# Patient Record
Sex: MALE | Race: BLACK OR AFRICAN AMERICAN | NOT HISPANIC OR LATINO | Employment: OTHER | URBAN - METROPOLITAN AREA
[De-identification: names, ages, dates, MRNs, and addresses within clinical notes are randomized per-mention and may not be internally consistent; named-entity substitution may affect disease eponyms.]

---

## 2018-02-12 ENCOUNTER — HISTORICAL (OUTPATIENT)
Dept: ADMINISTRATIVE | Facility: HOSPITAL | Age: 63
End: 2018-02-12

## 2018-02-12 LAB
ABS NEUT (OLG): 1.89 X10(3)/MCL (ref 2.1–9.2)
ALBUMIN SERPL-MCNC: 3.3 GM/DL (ref 3.4–5)
ALBUMIN/GLOB SERPL: 1 RATIO (ref 1–2)
ALP SERPL-CCNC: 98 UNIT/L (ref 45–117)
ALT SERPL-CCNC: 30 UNIT/L (ref 12–78)
AST SERPL-CCNC: 31 UNIT/L (ref 15–37)
BASOPHILS # BLD AUTO: 0.03 X10(3)/MCL
BASOPHILS NFR BLD AUTO: 1 % (ref 0–1)
BILIRUB SERPL-MCNC: 0.3 MG/DL (ref 0.2–1)
BILIRUBIN DIRECT+TOT PNL SERPL-MCNC: <0.1 MG/DL
BILIRUBIN DIRECT+TOT PNL SERPL-MCNC: ABNORMAL MG/DL
BUN SERPL-MCNC: 9 MG/DL (ref 7–18)
CALCIUM SERPL-MCNC: 8.9 MG/DL (ref 8.5–10.1)
CHLORIDE SERPL-SCNC: 106 MMOL/L (ref 98–107)
CO2 SERPL-SCNC: 30 MMOL/L (ref 21–32)
CREAT SERPL-MCNC: 0.9 MG/DL (ref 0.6–1.3)
CREAT UR-MCNC: 96 MG/DL
EOSINOPHIL # BLD AUTO: 0.11 X10(3)/MCL
EOSINOPHIL NFR BLD AUTO: 2 % (ref 0–5)
ERYTHROCYTE [DISTWIDTH] IN BLOOD BY AUTOMATED COUNT: 14.6 % (ref 11.5–14.5)
EST. AVERAGE GLUCOSE BLD GHB EST-MCNC: 177 MG/DL
GLOBULIN SER-MCNC: 5 GM/ML (ref 2.3–3.5)
GLUCOSE SERPL-MCNC: 139 MG/DL (ref 74–106)
HBA1C MFR BLD: 7.8 % (ref 4.2–6.3)
HCT VFR BLD AUTO: 36.8 % (ref 40–51)
HGB BLD-MCNC: 11.2 GM/DL (ref 13.5–17.5)
IMM GRANULOCYTES # BLD AUTO: 0.01 10*3/UL
IMM GRANULOCYTES NFR BLD AUTO: 0 %
LYMPHOCYTES # BLD AUTO: 1.9 X10(3)/MCL
LYMPHOCYTES NFR BLD AUTO: 43 % (ref 15–40)
MCH RBC QN AUTO: 27.9 PG (ref 26–34)
MCHC RBC AUTO-ENTMCNC: 30.4 GM/DL (ref 31–37)
MCV RBC AUTO: 91.8 FL (ref 80–100)
MONOCYTES # BLD AUTO: 0.44 X10(3)/MCL
MONOCYTES NFR BLD AUTO: 10 % (ref 4–12)
NEUTROPHILS # BLD AUTO: 1.89 X10(3)/MCL
NEUTROPHILS NFR BLD AUTO: 43 X10(3)/MCL
PLATELET # BLD AUTO: 211 X10(3)/MCL (ref 130–400)
PMV BLD AUTO: 9.6 FL (ref 7.4–10.4)
POTASSIUM SERPL-SCNC: 4.2 MMOL/L (ref 3.5–5.1)
PROT SERPL-MCNC: 8.3 GM/DL (ref 6.4–8.2)
PROT UR STRIP-MCNC: 8.3 MG/DL
PROT/CREAT UR-RTO: 86.5 MG/GM
RBC # BLD AUTO: 4.01 X10(6)/MCL (ref 4.5–5.9)
SODIUM SERPL-SCNC: 141 MMOL/L (ref 136–145)
WBC # SPEC AUTO: 4.4 X10(3)/MCL (ref 4.5–11)

## 2018-02-15 LAB
COLOR STL: NORMAL
CONSISTENCY STL: NORMAL
HEMOCCULT SP1 STL QL: NEGATIVE

## 2018-02-19 LAB
COLOR STL: NORMAL
CONSISTENCY STL: NORMAL
HEMOCCULT SP2 STL QL: NEGATIVE

## 2018-02-20 LAB
COLOR STL: NORMAL
CONSISTENCY STL: NORMAL

## 2018-02-21 ENCOUNTER — HISTORICAL (OUTPATIENT)
Dept: INTERNAL MEDICINE | Facility: CLINIC | Age: 63
End: 2018-02-21

## 2020-07-01 ENCOUNTER — HISTORICAL (OUTPATIENT)
Dept: INTERNAL MEDICINE | Facility: CLINIC | Age: 65
End: 2020-07-01

## 2020-07-01 LAB
ABS NEUT (OLG): 2.09 X10(3)/MCL (ref 2.1–9.2)
ALBUMIN SERPL-MCNC: 3.8 GM/DL (ref 3.4–5)
ALBUMIN/GLOB SERPL: 0.8 RATIO (ref 1.1–2)
ALP SERPL-CCNC: 102 UNIT/L (ref 45–117)
ALT SERPL-CCNC: 32 UNIT/L (ref 12–78)
AST SERPL-CCNC: 48 UNIT/L (ref 15–37)
BASOPHILS # BLD AUTO: 0 X10(3)/MCL (ref 0–0.2)
BASOPHILS NFR BLD AUTO: 1 %
BILIRUB SERPL-MCNC: 0.5 MG/DL (ref 0.2–1)
BILIRUBIN DIRECT+TOT PNL SERPL-MCNC: 0.2 MG/DL (ref 0–0.2)
BILIRUBIN DIRECT+TOT PNL SERPL-MCNC: 0.3 MG/DL
BUN SERPL-MCNC: 13 MG/DL (ref 7–18)
CALCIUM SERPL-MCNC: 8.8 MG/DL (ref 8.5–10.1)
CHLORIDE SERPL-SCNC: 105 MMOL/L (ref 98–107)
CHOLEST SERPL-MCNC: 134 MG/DL
CHOLEST/HDLC SERPL: 2.1 {RATIO} (ref 0–5)
CO2 SERPL-SCNC: 28 MMOL/L (ref 21–32)
CREAT SERPL-MCNC: 1 MG/DL (ref 0.6–1.3)
CREAT UR-MCNC: 84 MG/DL
EOSINOPHIL # BLD AUTO: 0.1 X10(3)/MCL (ref 0–0.9)
EOSINOPHIL NFR BLD AUTO: 2 %
ERYTHROCYTE [DISTWIDTH] IN BLOOD BY AUTOMATED COUNT: 13 % (ref 11.5–14.5)
GLOBULIN SER-MCNC: 4.5 GM/ML (ref 2.3–3.5)
GLUCOSE SERPL-MCNC: 147 MG/DL (ref 74–106)
HCT VFR BLD AUTO: 39.2 % (ref 40–51)
HDLC SERPL-MCNC: 65 MG/DL (ref 40–59)
HGB BLD-MCNC: 12.2 GM/DL (ref 13.5–17.5)
LDLC SERPL CALC-MCNC: 55 MG/DL
LYMPHOCYTES # BLD AUTO: 2 X10(3)/MCL (ref 0.6–4.6)
LYMPHOCYTES NFR BLD AUTO: 44 %
MCH RBC QN AUTO: 28.5 PG (ref 26–34)
MCHC RBC AUTO-ENTMCNC: 31.1 GM/DL (ref 31–37)
MCV RBC AUTO: 91.6 FL (ref 80–100)
MICROALBUMIN UR-MCNC: 16.9 MG/L (ref 0–19)
MICROALBUMIN/CREAT RATIO PNL UR: 20.1 MCG/MG CR (ref 0–29)
MONOCYTES # BLD AUTO: 0.4 X10(3)/MCL (ref 0.1–1.3)
MONOCYTES NFR BLD AUTO: 8 %
NEUTROPHILS # BLD AUTO: 2.09 X10(3)/MCL (ref 2.1–9.2)
NEUTROPHILS NFR BLD AUTO: 45 %
PLATELET # BLD AUTO: 191 X10(3)/MCL (ref 130–400)
PMV BLD AUTO: 10.2 FL (ref 7.4–10.4)
POTASSIUM SERPL-SCNC: 3.7 MMOL/L (ref 3.5–5.1)
PROT SERPL-MCNC: 8.3 GM/DL (ref 6.4–8.2)
RBC # BLD AUTO: 4.28 X10(6)/MCL (ref 4.5–5.9)
SODIUM SERPL-SCNC: 139 MMOL/L (ref 136–145)
TRIGL SERPL-MCNC: 71 MG/DL
VLDLC SERPL CALC-MCNC: 14 MG/DL
WBC # SPEC AUTO: 4.6 X10(3)/MCL (ref 4.5–11)

## 2020-12-17 ENCOUNTER — HISTORICAL (OUTPATIENT)
Dept: INTERNAL MEDICINE | Facility: CLINIC | Age: 65
End: 2020-12-17

## 2021-06-28 ENCOUNTER — HISTORICAL (OUTPATIENT)
Dept: ADMINISTRATIVE | Facility: HOSPITAL | Age: 66
End: 2021-06-28

## 2021-06-28 LAB
ABS NEUT (OLG): 2.24 X10(3)/MCL (ref 2.1–9.2)
ALBUMIN SERPL-MCNC: 3.9 GM/DL (ref 3.4–4.8)
ALBUMIN/GLOB SERPL: 1.1 RATIO (ref 1.1–2)
ALP SERPL-CCNC: 68 UNIT/L (ref 40–150)
ALT SERPL-CCNC: 19 UNIT/L (ref 0–55)
AST SERPL-CCNC: 34 UNIT/L (ref 5–34)
BASOPHILS # BLD AUTO: 0 X10(3)/MCL (ref 0–0.2)
BASOPHILS NFR BLD AUTO: 1 %
BILIRUB SERPL-MCNC: 0.4 MG/DL
BILIRUBIN DIRECT+TOT PNL SERPL-MCNC: 0.2 MG/DL (ref 0–0.5)
BILIRUBIN DIRECT+TOT PNL SERPL-MCNC: 0.2 MG/DL (ref 0–0.8)
BUN SERPL-MCNC: 11.7 MG/DL (ref 8.4–25.7)
CALCIUM SERPL-MCNC: 9.3 MG/DL (ref 8.8–10)
CHLORIDE SERPL-SCNC: 106 MMOL/L (ref 98–107)
CO2 SERPL-SCNC: 27 MMOL/L (ref 23–31)
CREAT SERPL-MCNC: 0.9 MG/DL (ref 0.73–1.18)
EOSINOPHIL # BLD AUTO: 0.1 X10(3)/MCL (ref 0–0.9)
EOSINOPHIL NFR BLD AUTO: 3 %
ERYTHROCYTE [DISTWIDTH] IN BLOOD BY AUTOMATED COUNT: 13.2 % (ref 11.5–14.5)
EST. AVERAGE GLUCOSE BLD GHB EST-MCNC: 128.4 MG/DL
GLOBULIN SER-MCNC: 3.6 GM/DL (ref 2.4–3.5)
GLUCOSE SERPL-MCNC: 109 MG/DL (ref 82–115)
HBA1C MFR BLD: 6.1 %
HCT VFR BLD AUTO: 40.1 % (ref 40–51)
HGB BLD-MCNC: 12.8 GM/DL (ref 13.5–17.5)
IMM GRANULOCYTES # BLD AUTO: 0.01 10*3/UL
IMM GRANULOCYTES NFR BLD AUTO: 0 %
LYMPHOCYTES # BLD AUTO: 1.8 X10(3)/MCL (ref 0.6–4.6)
LYMPHOCYTES NFR BLD AUTO: 39 %
MCH RBC QN AUTO: 29.5 PG (ref 26–34)
MCHC RBC AUTO-ENTMCNC: 31.9 GM/DL (ref 31–37)
MCV RBC AUTO: 92.4 FL (ref 80–100)
MONOCYTES # BLD AUTO: 0.4 X10(3)/MCL (ref 0.1–1.3)
MONOCYTES NFR BLD AUTO: 8 %
NEUTROPHILS # BLD AUTO: 2.24 X10(3)/MCL (ref 2.1–9.2)
NEUTROPHILS NFR BLD AUTO: 49 %
NRBC BLD AUTO-RTO: 0 % (ref 0–0.2)
PLATELET # BLD AUTO: 173 X10(3)/MCL (ref 130–400)
PMV BLD AUTO: 10.2 FL (ref 7.4–10.4)
POTASSIUM SERPL-SCNC: 3.7 MMOL/L (ref 3.5–5.1)
PROT SERPL-MCNC: 7.5 GM/DL (ref 5.8–7.6)
RBC # BLD AUTO: 4.34 X10(6)/MCL (ref 4.5–5.9)
SODIUM SERPL-SCNC: 141 MMOL/L (ref 136–145)
WBC # SPEC AUTO: 4.6 X10(3)/MCL (ref 4.5–11)

## 2022-04-06 LAB
LEFT EYE DM RETINOPATHY: NEGATIVE
LEFT EYE DM RETINOPATHY: NEGATIVE
RIGHT EYE DM RETINOPATHY: NEGATIVE
RIGHT EYE DM RETINOPATHY: NEGATIVE

## 2022-04-12 ENCOUNTER — HISTORICAL (OUTPATIENT)
Dept: ADMINISTRATIVE | Facility: HOSPITAL | Age: 67
End: 2022-04-12

## 2022-04-28 DIAGNOSIS — E78.5 HYPERLIPIDEMIA, UNSPECIFIED HYPERLIPIDEMIA TYPE: ICD-10-CM

## 2022-04-28 DIAGNOSIS — I10 HYPERTENSION, UNSPECIFIED TYPE: Primary | ICD-10-CM

## 2022-04-30 VITALS
DIASTOLIC BLOOD PRESSURE: 70 MMHG | SYSTOLIC BLOOD PRESSURE: 135 MMHG | BODY MASS INDEX: 22.04 KG/M2 | HEIGHT: 67 IN | WEIGHT: 140.44 LBS

## 2022-06-24 RX ORDER — METFORMIN HYDROCHLORIDE 1000 MG/1
1000 TABLET ORAL 2 TIMES DAILY
COMMUNITY
Start: 2022-03-17 | End: 2022-06-24 | Stop reason: SDUPTHER

## 2022-06-26 RX ORDER — METFORMIN HYDROCHLORIDE 1000 MG/1
1000 TABLET ORAL 2 TIMES DAILY
Qty: 60 TABLET | Refills: 3 | OUTPATIENT
Start: 2022-06-26 | End: 2022-06-27 | Stop reason: SDUPTHER

## 2022-06-27 RX ORDER — METFORMIN HYDROCHLORIDE 1000 MG/1
1000 TABLET ORAL 2 TIMES DAILY
Qty: 184 TABLET | Refills: 0 | Status: SHIPPED | OUTPATIENT
Start: 2022-06-27 | End: 2022-09-08 | Stop reason: SDUPTHER

## 2022-06-27 NOTE — TELEPHONE ENCOUNTER
Can you please re-sign this med? It was printed out instead of being sent to the pharmacy. I pended it to you correctly. Thanks!

## 2022-09-07 NOTE — PROGRESS NOTES
"[facility] INTERNAL MEDICINE  OUTPATIENT OFFICE VISIT NOTE    SUBJECTIVE:      HPI: Elsa De Guzman is a 66 y.o. yo male w/ PMH of DM, HTN, HLD, HX of CVA, HX of tobacco use, who presents today for F/U visit. He has no complaints at this time. He states able to engage in daily activities independently and w/o any difficulties.     ROS:  (-) Chest pain, palpitations, SOB, fever, night sweats, chills, diarrhea, constipation.       OBJECTIVE:     Vital signs:   BP (!) 148/75 (BP Location: Left arm, Patient Position: Sitting, BP Method: Large (Automatic))   Pulse 78   Temp 98.1 °F (36.7 °C) (Oral)   Resp 18   Ht 5' 6.93" (1.7 m)   Wt 64 kg (141 lb 3.2 oz)   BMI 22.16 kg/m²      Physical Examination:  General: Thin w/o distress  HEENT: NC/AT; PERRL; hard of hearing bilaterally; nasal and oral mucosa moist and clear  Neck: Full ROM; no lymphadenopathy  Pulm: CTA bilaterally, normal work of breathing  CV: S1, S2 w/o murmurs or gallops; no edema noted  GI: Soft with normal bowel sounds in all quadrants, no masses on palpation  MSK: Full ROM of all extremities and spine w/o limitation or discomfort  Derm: No rashes, abnormal bruising, or skin lesions  Neuro: AAOx4; motor/sensory function intact  Psych: Cooperative; appropriate mood and affect    Diabetic Foot Exam:   - Current Complaints: None   - Nails: Thickened   - Foot Deformities: Bilateral bunions     - Amputations: None    - Pedal Pulses: +2 bilaterally    - Dorsalis pedis: +2 bilaterally     - Posterior tibial: +2 bilaterally  - Skin Condition: Warm and dry bilaterally  - Sensory Exam: Intact bilaterally  - Risk: None  - Footwear: Sports shoes  - Education: DM foot care education provided and instructed patient to avoid going outside barefooted    Significant findings:  None     ASSESSMENT & PLAN:     DM  -At home fasting blood glucose level range   -Continue Metformin 1g BID    HTN  -Slightly hypertensive at this time; patient states his SBP at home range is " 120-130  -Continue Lisinopril 10mg daily    HLD  -Lipid panel WNL on 7/1/2020; will repeat lipid panel   -Continue atorvastatin 40mg daily    HX of CVA (2009)  -Patient is independent with ADLs/IADLs  -Continue ASA 81mg daily  -Continue atorvastatin 40mg daily    HX of tobacco use  -Smoked 1 PPD x 35 years  -Quit 15 years ago  -Patient refused lung cancer screening scan at this time    Health Maintenance:  -PPV23 (2/12/2018)  -COVID vaccines not received   -Shingrix #1 (11/17/2020) #2 (4/6/2022)  -Tdap (7/1/2020)  -Cologuard ordered today  -Hepatitis C screening ordered today  -Refused flu shot  -Will need eye exam next F/U visit    Return to clinic in 5 months. F/U on CMP, CBC, lipid panel, A1c, hep C.     Adolfo Vences,    [facility] Internal Medicine, PGY-1

## 2022-09-08 ENCOUNTER — OFFICE VISIT (OUTPATIENT)
Dept: INTERNAL MEDICINE | Facility: CLINIC | Age: 67
End: 2022-09-08
Payer: MEDICARE

## 2022-09-08 ENCOUNTER — LAB VISIT (OUTPATIENT)
Dept: LAB | Facility: HOSPITAL | Age: 67
End: 2022-09-08
Attending: STUDENT IN AN ORGANIZED HEALTH CARE EDUCATION/TRAINING PROGRAM
Payer: MEDICARE

## 2022-09-08 VITALS
TEMPERATURE: 98 F | HEIGHT: 67 IN | WEIGHT: 141.19 LBS | RESPIRATION RATE: 18 BRPM | BODY MASS INDEX: 22.16 KG/M2 | HEART RATE: 78 BPM | SYSTOLIC BLOOD PRESSURE: 148 MMHG | DIASTOLIC BLOOD PRESSURE: 75 MMHG

## 2022-09-08 DIAGNOSIS — E78.5 HYPERLIPIDEMIA, UNSPECIFIED HYPERLIPIDEMIA TYPE: ICD-10-CM

## 2022-09-08 DIAGNOSIS — Z87.891 HISTORY OF TOBACCO ABUSE: ICD-10-CM

## 2022-09-08 DIAGNOSIS — Z00.00 HEALTHCARE MAINTENANCE: ICD-10-CM

## 2022-09-08 DIAGNOSIS — I10 HYPERTENSION, UNSPECIFIED TYPE: ICD-10-CM

## 2022-09-08 DIAGNOSIS — E11.9 TYPE 2 DIABETES MELLITUS WITHOUT COMPLICATION, WITHOUT LONG-TERM CURRENT USE OF INSULIN: Primary | ICD-10-CM

## 2022-09-08 DIAGNOSIS — Z86.73 HISTORY OF CVA (CEREBROVASCULAR ACCIDENT): ICD-10-CM

## 2022-09-08 DIAGNOSIS — E11.9 TYPE 2 DIABETES MELLITUS WITHOUT COMPLICATION, WITHOUT LONG-TERM CURRENT USE OF INSULIN: ICD-10-CM

## 2022-09-08 LAB
ALBUMIN SERPL-MCNC: 4 GM/DL (ref 3.4–4.8)
ALBUMIN/GLOB SERPL: 1.1 RATIO (ref 1.1–2)
ALP SERPL-CCNC: 87 UNIT/L (ref 40–150)
ALT SERPL-CCNC: 24 UNIT/L (ref 0–55)
AST SERPL-CCNC: 31 UNIT/L (ref 5–34)
BASOPHILS # BLD AUTO: 0.04 X10(3)/MCL (ref 0–0.2)
BASOPHILS NFR BLD AUTO: 0.9 %
BILIRUBIN DIRECT+TOT PNL SERPL-MCNC: 0.3 MG/DL
BUN SERPL-MCNC: 12.7 MG/DL (ref 8.4–25.7)
CALCIUM SERPL-MCNC: 9.6 MG/DL (ref 8.8–10)
CHLORIDE SERPL-SCNC: 105 MMOL/L (ref 98–107)
CHOLEST SERPL-MCNC: 150 MG/DL
CHOLEST/HDLC SERPL: 3 {RATIO} (ref 0–5)
CO2 SERPL-SCNC: 27 MMOL/L (ref 23–31)
CREAT SERPL-MCNC: 1.04 MG/DL (ref 0.73–1.18)
EOSINOPHIL # BLD AUTO: 0.12 X10(3)/MCL (ref 0–0.9)
EOSINOPHIL NFR BLD AUTO: 2.8 %
ERYTHROCYTE [DISTWIDTH] IN BLOOD BY AUTOMATED COUNT: 12.9 % (ref 11.5–17)
EST. AVERAGE GLUCOSE BLD GHB EST-MCNC: 139.9 MG/DL
GFR SERPLBLD CREATININE-BSD FMLA CKD-EPI: >60 MLS/MIN/1.73/M2
GLOBULIN SER-MCNC: 3.7 GM/DL (ref 2.4–3.5)
GLUCOSE SERPL-MCNC: 117 MG/DL (ref 82–115)
HBA1C MFR BLD: 6.5 %
HCT VFR BLD AUTO: 42.3 % (ref 42–52)
HCV AB SERPL QL IA: NONREACTIVE
HDLC SERPL-MCNC: 57 MG/DL (ref 35–60)
HGB BLD-MCNC: 13.2 GM/DL (ref 14–18)
IMM GRANULOCYTES # BLD AUTO: 0.01 X10(3)/MCL (ref 0–0.04)
IMM GRANULOCYTES NFR BLD AUTO: 0.2 %
LDLC SERPL CALC-MCNC: 66 MG/DL (ref 50–140)
LYMPHOCYTES # BLD AUTO: 1.79 X10(3)/MCL (ref 0.6–4.6)
LYMPHOCYTES NFR BLD AUTO: 41.1 %
MCH RBC QN AUTO: 29.6 PG (ref 27–31)
MCHC RBC AUTO-ENTMCNC: 31.2 MG/DL (ref 33–36)
MCV RBC AUTO: 94.8 FL (ref 80–94)
MONOCYTES # BLD AUTO: 0.37 X10(3)/MCL (ref 0.1–1.3)
MONOCYTES NFR BLD AUTO: 8.5 %
NEUTROPHILS # BLD AUTO: 2 X10(3)/MCL (ref 2.1–9.2)
NEUTROPHILS NFR BLD AUTO: 46.5 %
NRBC BLD AUTO-RTO: 0 %
PLATELET # BLD AUTO: 207 X10(3)/MCL (ref 130–400)
PMV BLD AUTO: 11 FL (ref 7.4–10.4)
POTASSIUM SERPL-SCNC: 5 MMOL/L (ref 3.5–5.1)
PROT SERPL-MCNC: 7.7 GM/DL (ref 5.8–7.6)
RBC # BLD AUTO: 4.46 X10(6)/MCL (ref 4.7–6.1)
SODIUM SERPL-SCNC: 142 MMOL/L (ref 136–145)
TRIGL SERPL-MCNC: 136 MG/DL (ref 34–140)
VLDLC SERPL CALC-MCNC: 27 MG/DL
WBC # SPEC AUTO: 4.4 X10(3)/MCL (ref 4.5–11.5)

## 2022-09-08 PROCEDURE — 80061 LIPID PANEL: CPT

## 2022-09-08 PROCEDURE — 83036 HEMOGLOBIN GLYCOSYLATED A1C: CPT

## 2022-09-08 PROCEDURE — 85025 COMPLETE CBC W/AUTO DIFF WBC: CPT

## 2022-09-08 PROCEDURE — 99213 OFFICE O/P EST LOW 20 MIN: CPT | Mod: PBBFAC

## 2022-09-08 PROCEDURE — 80053 COMPREHEN METABOLIC PANEL: CPT

## 2022-09-08 PROCEDURE — 84156 ASSAY OF PROTEIN URINE: CPT

## 2022-09-08 PROCEDURE — 36415 COLL VENOUS BLD VENIPUNCTURE: CPT

## 2022-09-08 PROCEDURE — 86803 HEPATITIS C AB TEST: CPT

## 2022-09-08 RX ORDER — ATORVASTATIN CALCIUM 40 MG/1
40 TABLET, FILM COATED ORAL DAILY
Qty: 90 TABLET | Refills: 1 | Status: SHIPPED | OUTPATIENT
Start: 2022-09-08 | End: 2023-02-24 | Stop reason: SDUPTHER

## 2022-09-08 RX ORDER — LISINOPRIL 10 MG/1
10 TABLET ORAL DAILY
Qty: 90 TABLET | Refills: 1 | Status: SHIPPED | OUTPATIENT
Start: 2022-09-08 | End: 2023-02-24 | Stop reason: SDUPTHER

## 2022-09-08 RX ORDER — METFORMIN HYDROCHLORIDE 1000 MG/1
1000 TABLET ORAL 2 TIMES DAILY
Qty: 180 TABLET | Refills: 1 | Status: SHIPPED | OUTPATIENT
Start: 2022-09-08 | End: 2023-02-24 | Stop reason: SDUPTHER

## 2022-09-08 RX ORDER — NAPROXEN SODIUM 220 MG/1
81 TABLET, FILM COATED ORAL DAILY
COMMUNITY
End: 2022-09-08 | Stop reason: SDUPTHER

## 2022-09-08 RX ORDER — NAPROXEN SODIUM 220 MG/1
81 TABLET, FILM COATED ORAL DAILY
Qty: 90 TABLET | Refills: 1 | Status: SHIPPED | OUTPATIENT
Start: 2022-09-08 | End: 2023-02-24 | Stop reason: SDUPTHER

## 2022-09-08 RX ORDER — ATORVASTATIN CALCIUM 40 MG/1
40 TABLET, FILM COATED ORAL DAILY
COMMUNITY
Start: 2022-05-09 | End: 2022-09-08 | Stop reason: SDUPTHER

## 2022-09-08 RX ORDER — LISINOPRIL 10 MG/1
10 TABLET ORAL DAILY
COMMUNITY
Start: 2021-12-14 | End: 2022-09-08 | Stop reason: SDUPTHER

## 2022-12-01 ENCOUNTER — DOCUMENTATION ONLY (OUTPATIENT)
Dept: INTERNAL MEDICINE | Facility: CLINIC | Age: 67
End: 2022-12-01
Payer: MEDICARE

## 2022-12-30 ENCOUNTER — DOCUMENTATION ONLY (OUTPATIENT)
Dept: ADMINISTRATIVE | Facility: HOSPITAL | Age: 67
End: 2022-12-30
Payer: MEDICARE

## 2023-02-20 ENCOUNTER — TELEPHONE (OUTPATIENT)
Dept: INTERNAL MEDICINE | Facility: CLINIC | Age: 68
End: 2023-02-20
Payer: MEDICARE

## 2023-02-20 NOTE — TELEPHONE ENCOUNTER
Unable to speak to pt over the phone. Left voice message that we need to cancel appointment for today and provider is out of the office. We will be calling him back to reschedule appointment.

## 2023-02-22 NOTE — PROGRESS NOTES
"[facility] INTERNAL MEDICINE  OUTPATIENT OFFICE VISIT NOTE    SUBJECTIVE:      HPI: Elsa De Guzman is a 67 y.o. yo male w/ PMH of DM, HTN, HLD, HX of CVA, HX of tobacco use, who presents today for F/U visit. He has acute no complaints at this time. He continues to tolerated daily activities w/o difficulties. He states having increased intake of soft drinks (1-2 cans per day) since holiday season in 2022.     ROS:  (-) Chest pain, palpitations, SOB, fever, night sweats, chills, diarrhea, constipation.       OBJECTIVE:     Vital signs:   BP (!) 165/81 (BP Location: Left arm, Patient Position: Sitting, BP Method: Large (Automatic))   Pulse 80   Temp 97.9 °F (36.6 °C) (Oral)   Resp 18   Ht 5' 6.93" (1.7 m)   Wt 70 kg (154 lb 6.4 oz)   BMI 24.23 kg/m²      Physical Examination:  General: Thin w/o distress  HEENT: NC/AT; PERRL; hard of hearing bilaterally; nasal and oral mucosa moist and clear  Neck: Full ROM; no lymphadenopathy  Pulm: CTA bilaterally, normal work of breathing  CV: S1, S2 w/o murmurs or gallops; no edema noted  GI: Soft with normal bowel sounds in all quadrants, no masses on palpation  MSK: Full ROM of all extremities and spine w/o limitation or discomfort  Derm: No rashes, abnormal bruising, or skin lesions  Neuro: AAOx4; motor/sensory function intact  Psych: Cooperative; appropriate mood and affect    Significant findings:  None     ASSESSMENT & PLAN:     DM  -At home fasting blood glucose level range 100-110  -Patient states having increased intake of diet coke (1-2 cans daily for since holiday season in 2022); instructed patient to cut down on soft drinks  -A1c 6.5 on 9/8/2022; POC A1c 7.9 on 2/24/2023  -Continue Metformin 1g BID    HTN  -Hypertensive at this time d/t running out of home meds for a week; patient states his SBP at home range is 120-130  -BP goal should be <130/80  -Continue Lisinopril 10mg daily; may need to up titrate Lisinopril if BP continues to be elevated    HLD  -Lipid panel " 9/8/2022: cholesterol 150, LDL 66, trig 136  -Continue atorvastatin 40mg daily    HX of CVA (2009)  -Patient is independent with ADLs/IADLs  -Continue ASA 81mg daily  -Continue atorvastatin 40mg daily    HX of tobacco use  -Smoked 1 PPD x 35 years  -Quit 15 years ago  -Patient refused lung cancer screening scan at this time    Health Maintenance:  -PPV23 (2/12/2018); PCV 20 (2/24/2023)  -COVID vaccines not received   -Shingrix #1 (11/17/2020) #2 (4/6/2022)  -Tdap (7/1/2020)  -Cologuard ordered 2/24/2023  -Hepatitis C non-reactive 9/8/2022  -Refused flu shot  -DM foot exam performed on 9/8/2022  -Fundus eye exam 2/24/2023  -AAA screening ordered 2/24/2023    Return to clinic in 4 months. Nurse visit for BP check in 1 week.    Adolfo Vences DO   [facility] Internal Medicine, PGY-II

## 2023-02-24 ENCOUNTER — OFFICE VISIT (OUTPATIENT)
Dept: INTERNAL MEDICINE | Facility: CLINIC | Age: 68
End: 2023-02-24
Payer: MEDICARE

## 2023-02-24 VITALS
RESPIRATION RATE: 18 BRPM | DIASTOLIC BLOOD PRESSURE: 81 MMHG | BODY MASS INDEX: 24.23 KG/M2 | SYSTOLIC BLOOD PRESSURE: 165 MMHG | HEIGHT: 67 IN | WEIGHT: 154.38 LBS | TEMPERATURE: 98 F | HEART RATE: 80 BPM

## 2023-02-24 DIAGNOSIS — Z86.73 HISTORY OF CVA (CEREBROVASCULAR ACCIDENT): ICD-10-CM

## 2023-02-24 DIAGNOSIS — E11.69 TYPE 2 DIABETES MELLITUS WITH OTHER SPECIFIED COMPLICATION, WITHOUT LONG-TERM CURRENT USE OF INSULIN: Primary | ICD-10-CM

## 2023-02-24 DIAGNOSIS — Z23 NEED FOR PNEUMOCOCCAL VACCINATION: ICD-10-CM

## 2023-02-24 DIAGNOSIS — Z12.12 SCREENING FOR COLORECTAL CANCER: ICD-10-CM

## 2023-02-24 DIAGNOSIS — Z13.6 SCREENING FOR AAA (AORTIC ABDOMINAL ANEURYSM): ICD-10-CM

## 2023-02-24 DIAGNOSIS — Z87.891 HISTORY OF TOBACCO USE: ICD-10-CM

## 2023-02-24 DIAGNOSIS — I10 HYPERTENSION, UNSPECIFIED TYPE: ICD-10-CM

## 2023-02-24 DIAGNOSIS — Z12.11 SCREENING FOR COLORECTAL CANCER: ICD-10-CM

## 2023-02-24 DIAGNOSIS — E78.5 HYPERLIPIDEMIA, UNSPECIFIED HYPERLIPIDEMIA TYPE: ICD-10-CM

## 2023-02-24 LAB — HBA1C MFR BLD: 7.9 %

## 2023-02-24 PROCEDURE — 83036 HEMOGLOBIN GLYCOSYLATED A1C: CPT | Mod: PBBFAC

## 2023-02-24 PROCEDURE — 99213 OFFICE O/P EST LOW 20 MIN: CPT | Mod: PBBFAC

## 2023-02-24 PROCEDURE — 90677 PCV20 VACCINE IM: CPT | Mod: PBBFAC

## 2023-02-24 RX ORDER — LISINOPRIL 10 MG/1
10 TABLET ORAL DAILY
Qty: 90 TABLET | Refills: 1 | Status: SHIPPED | OUTPATIENT
Start: 2023-02-24 | End: 2023-06-14 | Stop reason: SDUPTHER

## 2023-02-24 RX ORDER — NAPROXEN SODIUM 220 MG/1
81 TABLET, FILM COATED ORAL DAILY
Qty: 90 TABLET | Refills: 1 | Status: SHIPPED | OUTPATIENT
Start: 2023-02-24 | End: 2023-11-28

## 2023-02-24 RX ORDER — ATORVASTATIN CALCIUM 40 MG/1
40 TABLET, FILM COATED ORAL DAILY
Qty: 90 TABLET | Refills: 1 | Status: SHIPPED | OUTPATIENT
Start: 2023-02-24 | End: 2023-06-14 | Stop reason: SDUPTHER

## 2023-02-24 RX ORDER — METFORMIN HYDROCHLORIDE 1000 MG/1
1000 TABLET ORAL 2 TIMES DAILY
Qty: 180 TABLET | Refills: 1 | Status: SHIPPED | OUTPATIENT
Start: 2023-02-24 | End: 2023-06-14 | Stop reason: SDUPTHER

## 2023-02-24 NOTE — PROGRESS NOTES
Faculty addendum:     I have reviewed the patients history, residents  findings on physical examination, diagnosis and treatment plan. Care provided was reasonable and necessary.     Bring back in a week for blood pressure check, resume home lisinopril at this time.    For patient's diabetes mellitus, resume metformin, recheck A1c prior to next clinic appointment 3 months.

## 2023-03-03 ENCOUNTER — CLINICAL SUPPORT (OUTPATIENT)
Dept: INTERNAL MEDICINE | Facility: CLINIC | Age: 68
End: 2023-03-03
Payer: MEDICARE

## 2023-03-03 VITALS
DIASTOLIC BLOOD PRESSURE: 70 MMHG | BODY MASS INDEX: 24.17 KG/M2 | TEMPERATURE: 98 F | WEIGHT: 154 LBS | SYSTOLIC BLOOD PRESSURE: 136 MMHG | HEIGHT: 67 IN | HEART RATE: 84 BPM | RESPIRATION RATE: 18 BRPM

## 2023-03-03 DIAGNOSIS — I10 HYPERTENSION, UNSPECIFIED TYPE: Primary | ICD-10-CM

## 2023-03-03 PROCEDURE — 99214 OFFICE O/P EST MOD 30 MIN: CPT | Mod: PBBFAC

## 2023-03-03 NOTE — PROGRESS NOTES
Here for BP Check per DR Adolfo Vences    BP Readin/70 manual reading    CT:84    Last dose of Medications: Lisinopril 10 mg taken 23 @     0800 am.    Complaints:none.   Provider sent  Blood Pressure reading. .        Patient iis not following low sodium diet plan. Patient instructed pn low sodium diet plan, foods to avoid and foods okay to eat. Patient voiced understanding of this information.                Discharged home with instructions and information to continue with all prescribed medications,follow up appointments, drink plenty of water daily, maintain low sodium intake and to walk/ exercise daily.Patient voiced understanding of discharge instructions.

## 2023-04-12 LAB
CREAT UR-MCNC: 64.8 MG/DL (ref 63–166)
PROT UR STRIP-MCNC: <6.8 MG/DL

## 2023-06-11 NOTE — PROGRESS NOTES
"[facility] INTERNAL MEDICINE  OUTPATIENT OFFICE VISIT NOTE    SUBJECTIVE:      HPI: Elsa De Guzman is a 67 y.o. yo male w/ PMH of DM, HTN, HLD, HX of CVA, HX of tobacco use, who presents today for F/U visit. He states feeling well and has no acute complaints since the last F/U visit.     ROS:  (-) Chest pain, palpitations, SOB, fever, night sweats, chills, diarrhea, constipation.       OBJECTIVE:     Vital signs:   /80 (BP Location: Left arm, Patient Position: Sitting, BP Method: Medium (Manual))   Pulse 80   Temp 98.2 °F (36.8 °C) (Oral)   Resp 18   Ht 5' 6" (1.676 m)   Wt 68 kg (150 lb)   SpO2 99%   BMI 24.21 kg/m²      Physical Examination:  General: Thin w/o distress  HEENT: NC/AT; PERRL; hard of hearing bilaterally; nasal and oral mucosa moist and clear  Pulm: CTA bilaterally, normal work of breathing  CV: S1, S2 w/o murmurs or gallops; no edema noted  GI: Bowel sound present in all quadrants  MSK: Full ROM of all extremities and spine w/o limitation or discomfort  Derm: No rashes, abnormal bruising, or skin lesions  Neuro: AAOx4; motor/sensory function intact  Psych: Cooperative; appropriate mood and affect    Diabetic Foot Exam:   - Medical History: DM II  - Current Complaints: None  - Nails: Long  - Foot Deformities: None    - Amputations: None    - Pedal Pulses: +2 bilaterally    - Dorsalis pedis: +2 bilaterally     - Posterior tibial: +2 bilaterally  - Skin Condition: Warm and dry bilaterally  - Sensory Exam: Intact bilaterally  - Risk: None  - Footwear: Sports shoes  - Education: DM foot care education provided and instructed patient to avoid going outside barefooted    Significant findings:  None     ASSESSMENT & PLAN:     DM  -POC A1c 7.9 on (2/24/2023); POC A1c 7.1 (6/14/2023)  -Patient has completely eliminated soft drinks from his diet  -Continue Metformin 1g BID    HTN  -Normotensive at this time   -BP goal should be <130/80  -Continue Lisinopril 10mg daily; may need to up titrate Lisinopril if " BP continues to be elevated    HLD  -Lipid panel 9/8/2022: cholesterol 150, LDL 66, trig 136  -Continue atorvastatin 40mg daily    HX of CVA (2009)  -Patient is independent with ADLs/IADLs  -Continue ASA 81mg daily  -Continue atorvastatin 40mg daily    HX of tobacco use  -Smoked 1 PPD x 35 years  -Quit 15 years ago  -Patient refuses low dose lung CT scan    Health Maintenance:  -Cologuard ordered 2/24/2023, instructed patient to complete the test  -Hepatitis C non-reactive 9/8/2022  -DM foot exam performed 6/14/2023  -Fundus eye exam ordered 6/14/2023  -AAA screening (3/8/2023) negative  -Vaccines:    -PPV23 (2/12/2018); PCV 20 (2/24/2023)    -COVID vaccines not received     -Shingrix #1 (11/17/2020) #2 (4/6/2022)    -Tdap (7/1/2020)    -Refuses flu shot    Return to clinic in 5 months. F/U on cologuard, CMP, CBC, lipid panel. Referral to podiatry placed on 6/14/2023.    Adolfo Vences DO   [facility] Internal Medicine, PGY-II

## 2023-06-14 ENCOUNTER — CLINICAL SUPPORT (OUTPATIENT)
Dept: INTERNAL MEDICINE | Facility: CLINIC | Age: 68
End: 2023-06-14
Attending: STUDENT IN AN ORGANIZED HEALTH CARE EDUCATION/TRAINING PROGRAM
Payer: MEDICARE

## 2023-06-14 ENCOUNTER — OFFICE VISIT (OUTPATIENT)
Dept: INTERNAL MEDICINE | Facility: CLINIC | Age: 68
End: 2023-06-14
Payer: MEDICARE

## 2023-06-14 VITALS
DIASTOLIC BLOOD PRESSURE: 80 MMHG | BODY MASS INDEX: 24.11 KG/M2 | OXYGEN SATURATION: 99 % | TEMPERATURE: 98 F | HEIGHT: 66 IN | WEIGHT: 150 LBS | RESPIRATION RATE: 18 BRPM | HEART RATE: 80 BPM | SYSTOLIC BLOOD PRESSURE: 124 MMHG

## 2023-06-14 DIAGNOSIS — I10 HYPERTENSION, UNSPECIFIED TYPE: ICD-10-CM

## 2023-06-14 DIAGNOSIS — Z00.00 HEALTHCARE MAINTENANCE: ICD-10-CM

## 2023-06-14 DIAGNOSIS — E78.5 HYPERLIPIDEMIA, UNSPECIFIED HYPERLIPIDEMIA TYPE: ICD-10-CM

## 2023-06-14 DIAGNOSIS — Z87.891 HISTORY OF TOBACCO USE: ICD-10-CM

## 2023-06-14 DIAGNOSIS — Z86.73 HISTORY OF CVA (CEREBROVASCULAR ACCIDENT): ICD-10-CM

## 2023-06-14 DIAGNOSIS — E11.8 CONTROLLED TYPE 2 DIABETES MELLITUS WITH COMPLICATION, WITHOUT LONG-TERM CURRENT USE OF INSULIN: Primary | ICD-10-CM

## 2023-06-14 DIAGNOSIS — E11.8 CONTROLLED TYPE 2 DIABETES MELLITUS WITH COMPLICATION, WITHOUT LONG-TERM CURRENT USE OF INSULIN: ICD-10-CM

## 2023-06-14 LAB — HBA1C MFR BLD: 7.1 %

## 2023-06-14 PROCEDURE — 83036 HEMOGLOBIN GLYCOSYLATED A1C: CPT | Mod: PBBFAC

## 2023-06-14 PROCEDURE — 99214 OFFICE O/P EST MOD 30 MIN: CPT | Mod: PBBFAC

## 2023-06-14 RX ORDER — LISINOPRIL 10 MG/1
10 TABLET ORAL DAILY
Qty: 90 TABLET | Refills: 1 | Status: SHIPPED | OUTPATIENT
Start: 2023-06-14 | End: 2023-11-28 | Stop reason: SDUPTHER

## 2023-06-14 RX ORDER — METFORMIN HYDROCHLORIDE 1000 MG/1
1000 TABLET ORAL 2 TIMES DAILY
Qty: 180 TABLET | Refills: 1 | Status: SHIPPED | OUTPATIENT
Start: 2023-06-14 | End: 2023-11-28 | Stop reason: SDUPTHER

## 2023-06-14 RX ORDER — ATORVASTATIN CALCIUM 40 MG/1
40 TABLET, FILM COATED ORAL DAILY
Qty: 90 TABLET | Refills: 1 | Status: SHIPPED | OUTPATIENT
Start: 2023-06-14 | End: 2023-11-28 | Stop reason: SDUPTHER

## 2023-06-20 NOTE — PROGRESS NOTES
"Chief Complaint   Patient presents with     RECHECK       Initial /80 (BP Location: Right arm, Patient Position: Sitting, Cuff Size: Adult Regular)  Pulse 61  Ht 5' 11.06\" (1.805 m)  Wt 202 lb 6.4 oz (91.8 kg)  SpO2 98%  BMI 28.18 kg/m2 Estimated body mass index is 28.18 kg/(m^2) as calculated from the following:    Height as of this encounter: 5' 11.06\" (1.805 m).    Weight as of this encounter: 202 lb 6.4 oz (91.8 kg).  Medication Reconciliation: complete   Angie Calderon MA      " Elsa De Guzman is a 67 y.o. male here for a diabetic eye screening with non-dilated fundus photos per Adolfo Vences MD.    Patient cooperative?: Yes  Small pupils?: Yes  Last eye exam: unknown    Images were low quality and inadequate for interpretation. Patient was referred to Ophthalmology.

## 2023-11-27 NOTE — PROGRESS NOTES
"[facility] INTERNAL MEDICINE  OUTPATIENT OFFICE VISIT NOTE    SUBJECTIVE:      HPI: Elsa De Guzman is a 67 y.o. yo male w/ PMH of DM, HTN, HLD, HX of CVA, HX of tobacco use, who presents today for F/U visit. He states feeling well and has no acute complaints since the last F/U visit.     ROS:  (-) Chest pain, palpitations, SOB, fever, night sweats, chills, diarrhea, constipation.    OBJECTIVE:     Vital signs:   /64 (BP Location: Left arm, Patient Position: Sitting, BP Method: Medium (Automatic))   Pulse 82   Temp 98.5 °F (36.9 °C) (Oral)   Resp 16   Ht 5' 6" (1.676 m)   Wt 65.4 kg (144 lb 3.2 oz)   SpO2 99%   BMI 23.27 kg/m²      Physical Examination:  General: Thin w/o distress  HEENT: NC/AT; PERRL; hard of hearing bilaterally  Pulm: CTA bilaterally, normal work of breathing  CV: S1, S2 w/o murmurs or gallops; no edema noted  GI: Bowel sound present in all quadrants  MSK: Full ROM of all extremities and spine w/o limitation or discomfort  Derm: No rashes, abnormal bruising, or skin lesions  Neuro: AAOx4; motor/sensory function intact  Psych: Cooperative; appropriate mood and affect    Significant findings:  None     ASSESSMENT & PLAN:     DM  -POC A1c 7.9 on (2/24/2023); POC A1c 7.1 (6/14/2023); will repeat A1c  -Patient has completely eliminated soft drinks from his diet  -Continue Metformin 1g BID    HTN  -BP controlled at this time  -Continue Lisinopril 10mg daily; may need to up titrate Lisinopril if BP continues to be elevated    HLD  -Lipid panel 9/8/2022: cholesterol 150, LDL 66, trig 136  -Continue atorvastatin 40mg daily    HX of CVA (2009)  -Patient is independent with ADLs/IADLs  -Continue ASA 81mg daily and atorvastatin 40mg QHS    HX of tobacco use  -Smoked 1 PPD x 25 years, quit more than 15 years ago    Health Maintenance:  -Cologuard re-ordered 11/28/2023, instructed patient to complete the test  -Hepatitis C non-reactive 9/8/2022  -DM foot exam performed 6/14/2023  -Fundus eye exam ordered " 6/14/2023  -AAA screening (3/8/2023) negative  -Vaccines:    -PPV23 (2/12/2018); PCV 20 (2/24/2023)    -COVID vaccines not received     -Shingrix #1 (11/17/2020) #2 (4/6/2022)    -Tdap (7/1/2020)    -Refuses flu shot    Return to clinic in 6 months. F/U on cologuard, A1c, CMP, CBC, lipid panel. Referral to podiatry placed on 6/14/2023.    Adolfo Vences DO   [facility] Internal Medicine, PGY-II

## 2023-11-28 ENCOUNTER — OFFICE VISIT (OUTPATIENT)
Dept: INTERNAL MEDICINE | Facility: CLINIC | Age: 68
End: 2023-11-28
Payer: MEDICARE

## 2023-11-28 VITALS
SYSTOLIC BLOOD PRESSURE: 132 MMHG | TEMPERATURE: 99 F | HEIGHT: 66 IN | RESPIRATION RATE: 16 BRPM | HEART RATE: 82 BPM | BODY MASS INDEX: 23.17 KG/M2 | WEIGHT: 144.19 LBS | OXYGEN SATURATION: 99 % | DIASTOLIC BLOOD PRESSURE: 64 MMHG

## 2023-11-28 DIAGNOSIS — I10 HYPERTENSION, UNSPECIFIED TYPE: ICD-10-CM

## 2023-11-28 DIAGNOSIS — Z00.00 HEALTHCARE MAINTENANCE: ICD-10-CM

## 2023-11-28 DIAGNOSIS — Z12.11 ENCOUNTER FOR COLORECTAL CANCER SCREENING: ICD-10-CM

## 2023-11-28 DIAGNOSIS — Z86.73 HISTORY OF CVA (CEREBROVASCULAR ACCIDENT): ICD-10-CM

## 2023-11-28 DIAGNOSIS — E78.5 HYPERLIPIDEMIA, UNSPECIFIED HYPERLIPIDEMIA TYPE: ICD-10-CM

## 2023-11-28 DIAGNOSIS — Z12.12 ENCOUNTER FOR COLORECTAL CANCER SCREENING: ICD-10-CM

## 2023-11-28 DIAGNOSIS — E11.8 CONTROLLED TYPE 2 DIABETES MELLITUS WITH COMPLICATION, WITHOUT LONG-TERM CURRENT USE OF INSULIN: Primary | ICD-10-CM

## 2023-11-28 DIAGNOSIS — Z87.891 HISTORY OF TOBACCO USE: ICD-10-CM

## 2023-11-28 PROCEDURE — 99213 OFFICE O/P EST LOW 20 MIN: CPT | Mod: PBBFAC | Performed by: STUDENT IN AN ORGANIZED HEALTH CARE EDUCATION/TRAINING PROGRAM

## 2023-11-28 RX ORDER — LISINOPRIL 10 MG/1
10 TABLET ORAL DAILY
Qty: 90 TABLET | Refills: 1 | Status: SHIPPED | OUTPATIENT
Start: 2023-11-28 | End: 2024-05-26

## 2023-11-28 RX ORDER — ATORVASTATIN CALCIUM 40 MG/1
40 TABLET, FILM COATED ORAL NIGHTLY
Qty: 90 TABLET | Refills: 1 | Status: SHIPPED | OUTPATIENT
Start: 2023-11-28 | End: 2024-05-26

## 2023-11-28 RX ORDER — METFORMIN HYDROCHLORIDE 1000 MG/1
1000 TABLET ORAL 2 TIMES DAILY
Qty: 180 TABLET | Refills: 1 | Status: SHIPPED | OUTPATIENT
Start: 2023-11-28 | End: 2024-05-26

## 2024-05-12 NOTE — PROGRESS NOTES
"[facility] INTERNAL MEDICINE  OUTPATIENT OFFICE VISIT NOTE    SUBJECTIVE:      HPI: Elsa De Guzman is a 68 y.o. yo male w/ PMH of DM, HTN, HLD, HX of CVA, HX of tobacco use, who presents today for F/U visit. He states feeling well and has no acute complaints since the last F/U visit. Weight gain from 144 lbs (11/28/2023) to 150 lbs (5/13/2024) with increased PO intake.     ROS:  (-) Chest pain, palpitations, SOB, fever, night sweats, chills, diarrhea, constipation.    OBJECTIVE:     Vital signs:   /83 (BP Location: Left arm, Patient Position: Sitting, BP Method: Medium (Automatic))   Pulse 86   Temp 98.2 °F (36.8 °C) (Oral)   Resp 16   Ht 5' 6" (1.676 m)   Wt 68 kg (150 lb)   SpO2 99%   BMI 24.21 kg/m²      Physical Examination:  General: Thin w/o distress  HEENT: NC/AT; PERRL; hard of hearing bilaterally  Pulm: CTA bilaterally, normal work of breathing  CV: S1, S2 w/o murmurs or gallops; no edema noted  GI: Bowel sound present in all quadrants  MSK: Full ROM of all extremities  Neuro: AAOx4; motor/sensory function intact  Psych: Cooperative; appropriate mood and affect    Significant findings:  None     ASSESSMENT & PLAN:     DM  -POC A1c 7.1 (6/14/2023); 7.2 (5/13/2024)  -Continue Metformin 1g BID  -Referral to podiatry placed on 6/14/2023, currently pending for an appointment    HTN  -Normotensive at this time  -Continue Lisinopril 10mg daily; may need to up titrate Lisinopril if BP continues to be elevated    HLD  -Lipid panel 9/8/2022: cholesterol 150, LDL 66, trig 136  -Continue atorvastatin 40mg daily    HX of CVA (2009)  -Patient is independent with ADLs/IADLs  -Continue ASA 81mg daily and atorvastatin 40mg QHS    HX of tobacco use  -Smoked 1 PPD x 25 years, quit more than 15 years ago    Health Maintenance:  -Cologuard re-ordered 11/28/2023, instructed patient to complete the test  -Hepatitis C non-reactive 9/8/2022  -DM foot exam performed 5/13/2024  -DM eye exam ordered 5/13/2024  -AAA screening " (3/8/2023) negative  -Vaccines:    -PPV23 (2/12/2018); PCV 20 (2/24/2023)    -COVID vaccines not received     -Shingrix #1 (11/17/2020) #2 (4/6/2022)    -Tdap (7/1/2020)    -Refuses flu shot    Return to clinic in 6 months. F/U on cologuard, A1c, CMP, CBC, lipid panel.    Adolfo Vences DO   [facility] Internal Medicine, PGY-III

## 2024-05-13 ENCOUNTER — CLINICAL SUPPORT (OUTPATIENT)
Dept: INTERNAL MEDICINE | Facility: CLINIC | Age: 69
End: 2024-05-13
Attending: STUDENT IN AN ORGANIZED HEALTH CARE EDUCATION/TRAINING PROGRAM
Payer: MEDICARE

## 2024-05-13 ENCOUNTER — OFFICE VISIT (OUTPATIENT)
Dept: INTERNAL MEDICINE | Facility: CLINIC | Age: 69
End: 2024-05-13
Payer: MEDICARE

## 2024-05-13 VITALS
RESPIRATION RATE: 16 BRPM | DIASTOLIC BLOOD PRESSURE: 83 MMHG | SYSTOLIC BLOOD PRESSURE: 134 MMHG | HEART RATE: 86 BPM | WEIGHT: 150 LBS | BODY MASS INDEX: 24.11 KG/M2 | HEIGHT: 66 IN | TEMPERATURE: 98 F | OXYGEN SATURATION: 99 %

## 2024-05-13 DIAGNOSIS — E78.5 HYPERLIPIDEMIA, UNSPECIFIED HYPERLIPIDEMIA TYPE: ICD-10-CM

## 2024-05-13 DIAGNOSIS — E11.8 CONTROLLED TYPE 2 DIABETES MELLITUS WITH COMPLICATION, WITHOUT LONG-TERM CURRENT USE OF INSULIN: ICD-10-CM

## 2024-05-13 DIAGNOSIS — Z87.891 HISTORY OF TOBACCO USE: ICD-10-CM

## 2024-05-13 DIAGNOSIS — E11.8 CONTROLLED TYPE 2 DIABETES MELLITUS WITH COMPLICATION, WITHOUT LONG-TERM CURRENT USE OF INSULIN: Primary | ICD-10-CM

## 2024-05-13 DIAGNOSIS — Z86.73 HISTORY OF CVA (CEREBROVASCULAR ACCIDENT): ICD-10-CM

## 2024-05-13 DIAGNOSIS — I10 HYPERTENSION, UNSPECIFIED TYPE: ICD-10-CM

## 2024-05-13 LAB — HBA1C MFR BLD: 7.2 %

## 2024-05-13 PROCEDURE — 83036 HEMOGLOBIN GLYCOSYLATED A1C: CPT | Mod: PBBFAC | Performed by: STUDENT IN AN ORGANIZED HEALTH CARE EDUCATION/TRAINING PROGRAM

## 2024-05-13 PROCEDURE — 92228 IMG RTA DETC/MNTR DS PHY/QHP: CPT | Mod: TC,PBBFAC,59 | Performed by: INTERNAL MEDICINE

## 2024-05-13 PROCEDURE — 99213 OFFICE O/P EST LOW 20 MIN: CPT | Mod: PBBFAC,25 | Performed by: STUDENT IN AN ORGANIZED HEALTH CARE EDUCATION/TRAINING PROGRAM

## 2024-05-13 RX ORDER — ATORVASTATIN CALCIUM 40 MG/1
40 TABLET, FILM COATED ORAL NIGHTLY
Qty: 90 TABLET | Refills: 3 | Status: SHIPPED | OUTPATIENT
Start: 2024-05-13 | End: 2025-05-13

## 2024-05-13 RX ORDER — METFORMIN HYDROCHLORIDE 1000 MG/1
1000 TABLET ORAL 2 TIMES DAILY
Qty: 180 TABLET | Refills: 3 | Status: SHIPPED | OUTPATIENT
Start: 2024-05-13 | End: 2025-05-13

## 2024-05-13 RX ORDER — LISINOPRIL 10 MG/1
10 TABLET ORAL DAILY
Qty: 90 TABLET | Refills: 3 | Status: SHIPPED | OUTPATIENT
Start: 2024-05-13 | End: 2025-05-13

## 2024-05-13 RX ORDER — NAPROXEN SODIUM 220 MG/1
81 TABLET, FILM COATED ORAL DAILY
Qty: 90 TABLET | Refills: 3 | Status: SHIPPED | OUTPATIENT
Start: 2024-05-13 | End: 2025-05-13

## 2024-05-13 NOTE — PROGRESS NOTES
Elsa De Guzman is a 68 y.o. male here for a diabetic eye screening with non-dilated fundus photos per Adolfo Vences, DO.    Patient cooperative?: Yes  Small pupils?: No  Last eye exam: unknown    For exam results, see Encounter Report.      [FreeTextEntry1] : follow up [de-identified] : 60 y o m with pmh of afib on xeralto , HTN , DLD, and d.m presented today for routine f/u. \par no complaints\par medications refilled \par labs were reviwed with the patients\par

## 2024-05-13 NOTE — PROGRESS NOTES
I have reviewed and concur with the resident's history, physical, assessment, and plan.  I have discussed with him all issues related to the diagnosis, workup and treatment plan. Care provided as reasonable and necessary.    Lorenzo Espino MD  [facility]

## 2024-11-04 NOTE — PROGRESS NOTES
"[facility] INTERNAL MEDICINE  OUTPATIENT OFFICE VISIT NOTE    SUBJECTIVE:      HPI: Elsa De Guzman is a 68 y.o. yo male w/ PMH of DM, HTN, HLD, HX of CVA, HX of tobacco use, who presents today for F/U visit. At this time, he has no acute complaints at this time and is compliant with his home meds.     ROS:  (-) Chest pain, palpitations, SOB, fever, night sweats, chills, diarrhea, constipation.    OBJECTIVE:     Vital signs:   BP (!) 148/74 (BP Location: Left arm, Patient Position: Sitting)   Pulse 76   Temp 97.6 °F (36.4 °C) (Oral)   Resp 16   Ht 5' 6" (1.676 m)   Wt 71.1 kg (156 lb 12.8 oz)   SpO2 99%   BMI 25.31 kg/m²      Physical Examination:  General: Thin w/o distress  HEENT: NC/AT; PERRL; hard of hearing bilaterally  Pulm: CTA bilaterally, normal work of breathing  CV: S1, S2 w/o murmurs or gallops; no edema noted  GI: Bowel sound present in all quadrants  MSK: Full ROM of all extremities  Neuro: AAOx4; motor/sensory function intact  Psych: Cooperative; appropriate mood and affect    ASSESSMENT & PLAN:     HTN  -BP initially at 155/80, recheck BP decreased to 148/74  -Increased Lisinopril to 20mg daily on 11/6/2024  -Instructed patient to maintain a BP log  -RN visit in 2 weeks for BP recheck, will further titrate antihypertensive if needed    DM II  -POC A1c 7.1 (6/14/2023); 7.2 (5/13/2024)  -Continue Metformin 1g BID, will plan to start secondary anti-diabetic if A1c >8% based on [employer] recommendation    HLD  -Lipid panel 9/8/2022: cholesterol 150, LDL 66, trig 136  -Continue atorvastatin 40mg daily    HX of CVA (2009)  -Patient is independent with ADLs/IADLs  -Continue ASA 81mg daily and atorvastatin 40mg QHS    HX of tobacco use  -Smoked 1 PPD x 25 years, quit more than 15 years ago    Health Maintenance:  -Cologuard re-ordered, instructed patient to complete the test  -Hepatitis C non-reactive 9/8/2022  -DM foot exam performed 5/13/2024  -DM eye exam ordered 5/13/2024  -AAA screening " (3/8/2023) negative  -Vaccines:    -PPV23 (2/12/2018); PCV 20 (2/24/2023)    -COVID vaccines not received     -Shingrix #1 (11/17/2020) #2 (4/6/2022)    -Tdap (7/1/2020)    -Refuses flu shot    Return to clinic in 3 months. F/U on cologuard; instructed patient to complete A1c, CMP, CBC, lipid panel today. Further titration on antihypertensive meds if BP remains elevated.     Adolfo Vences DO   [facility] Internal Medicine, PGY-III

## 2024-11-06 ENCOUNTER — LAB VISIT (OUTPATIENT)
Dept: LAB | Facility: HOSPITAL | Age: 69
End: 2024-11-06
Attending: STUDENT IN AN ORGANIZED HEALTH CARE EDUCATION/TRAINING PROGRAM
Payer: MEDICARE

## 2024-11-06 ENCOUNTER — OFFICE VISIT (OUTPATIENT)
Dept: INTERNAL MEDICINE | Facility: CLINIC | Age: 69
End: 2024-11-06
Payer: MEDICARE

## 2024-11-06 VITALS
TEMPERATURE: 98 F | SYSTOLIC BLOOD PRESSURE: 148 MMHG | DIASTOLIC BLOOD PRESSURE: 74 MMHG | OXYGEN SATURATION: 99 % | RESPIRATION RATE: 16 BRPM | BODY MASS INDEX: 25.2 KG/M2 | WEIGHT: 156.81 LBS | HEIGHT: 66 IN | HEART RATE: 76 BPM

## 2024-11-06 DIAGNOSIS — Z86.73 HISTORY OF CVA (CEREBROVASCULAR ACCIDENT): ICD-10-CM

## 2024-11-06 DIAGNOSIS — E78.5 HYPERLIPIDEMIA, UNSPECIFIED HYPERLIPIDEMIA TYPE: ICD-10-CM

## 2024-11-06 DIAGNOSIS — I10 HYPERTENSION, UNSPECIFIED TYPE: ICD-10-CM

## 2024-11-06 DIAGNOSIS — I10 PRIMARY HYPERTENSION: Primary | ICD-10-CM

## 2024-11-06 DIAGNOSIS — E11.8 CONTROLLED TYPE 2 DIABETES MELLITUS WITH COMPLICATION, WITHOUT LONG-TERM CURRENT USE OF INSULIN: ICD-10-CM

## 2024-11-06 LAB
ALBUMIN SERPL-MCNC: 3.8 G/DL (ref 3.4–4.8)
ALBUMIN/GLOB SERPL: 1 RATIO (ref 1.1–2)
ALP SERPL-CCNC: 87 UNIT/L (ref 40–150)
ALT SERPL-CCNC: 17 UNIT/L (ref 0–55)
ANION GAP SERPL CALC-SCNC: 8 MEQ/L
AST SERPL-CCNC: 29 UNIT/L (ref 5–34)
BASOPHILS # BLD AUTO: 0.05 X10(3)/MCL
BASOPHILS NFR BLD AUTO: 1.2 %
BILIRUB SERPL-MCNC: 0.3 MG/DL
BUN SERPL-MCNC: 8.9 MG/DL (ref 8.4–25.7)
CALCIUM SERPL-MCNC: 9.4 MG/DL (ref 8.8–10)
CHLORIDE SERPL-SCNC: 105 MMOL/L (ref 98–107)
CHOLEST SERPL-MCNC: 144 MG/DL
CHOLEST/HDLC SERPL: 3 {RATIO} (ref 0–5)
CO2 SERPL-SCNC: 28 MMOL/L (ref 23–31)
CREAT SERPL-MCNC: 1.11 MG/DL (ref 0.72–1.25)
CREAT/UREA NIT SERPL: 8
EOSINOPHIL # BLD AUTO: 0.11 X10(3)/MCL (ref 0–0.9)
EOSINOPHIL NFR BLD AUTO: 2.5 %
ERYTHROCYTE [DISTWIDTH] IN BLOOD BY AUTOMATED COUNT: 12.1 % (ref 11.5–17)
EST. AVERAGE GLUCOSE BLD GHB EST-MCNC: 177.2 MG/DL
GFR SERPLBLD CREATININE-BSD FMLA CKD-EPI: >60 ML/MIN/1.73/M2
GLOBULIN SER-MCNC: 4 GM/DL (ref 2.4–3.5)
GLUCOSE SERPL-MCNC: 171 MG/DL (ref 82–115)
HBA1C MFR BLD: 7.8 %
HCT VFR BLD AUTO: 41.8 % (ref 42–52)
HDLC SERPL-MCNC: 49 MG/DL (ref 35–60)
HGB BLD-MCNC: 13.2 G/DL (ref 14–18)
IMM GRANULOCYTES # BLD AUTO: 0.03 X10(3)/MCL (ref 0–0.04)
IMM GRANULOCYTES NFR BLD AUTO: 0.7 %
LDLC SERPL CALC-MCNC: 65 MG/DL (ref 50–140)
LYMPHOCYTES # BLD AUTO: 1.85 X10(3)/MCL (ref 0.6–4.6)
LYMPHOCYTES NFR BLD AUTO: 42.8 %
MCH RBC QN AUTO: 29.7 PG (ref 27–31)
MCHC RBC AUTO-ENTMCNC: 31.6 G/DL (ref 33–36)
MCV RBC AUTO: 93.9 FL (ref 80–94)
MONOCYTES # BLD AUTO: 0.35 X10(3)/MCL (ref 0.1–1.3)
MONOCYTES NFR BLD AUTO: 8.1 %
NEUTROPHILS # BLD AUTO: 1.93 X10(3)/MCL (ref 2.1–9.2)
NEUTROPHILS NFR BLD AUTO: 44.7 %
NRBC BLD AUTO-RTO: 0 %
PLATELET # BLD AUTO: 202 X10(3)/MCL (ref 130–400)
PMV BLD AUTO: 10.7 FL (ref 7.4–10.4)
POTASSIUM SERPL-SCNC: 4.3 MMOL/L (ref 3.5–5.1)
PROT SERPL-MCNC: 7.8 GM/DL (ref 5.8–7.6)
RBC # BLD AUTO: 4.45 X10(6)/MCL (ref 4.7–6.1)
SODIUM SERPL-SCNC: 141 MMOL/L (ref 136–145)
TRIGL SERPL-MCNC: 148 MG/DL (ref 34–140)
VLDLC SERPL CALC-MCNC: 30 MG/DL
WBC # BLD AUTO: 4.32 X10(3)/MCL (ref 4.5–11.5)

## 2024-11-06 PROCEDURE — 36415 COLL VENOUS BLD VENIPUNCTURE: CPT

## 2024-11-06 PROCEDURE — 85025 COMPLETE CBC W/AUTO DIFF WBC: CPT

## 2024-11-06 PROCEDURE — 80053 COMPREHEN METABOLIC PANEL: CPT

## 2024-11-06 PROCEDURE — 83036 HEMOGLOBIN GLYCOSYLATED A1C: CPT

## 2024-11-06 PROCEDURE — 99214 OFFICE O/P EST MOD 30 MIN: CPT | Mod: PBBFAC | Performed by: STUDENT IN AN ORGANIZED HEALTH CARE EDUCATION/TRAINING PROGRAM

## 2024-11-06 PROCEDURE — 80061 LIPID PANEL: CPT

## 2024-11-06 RX ORDER — METFORMIN HYDROCHLORIDE 1000 MG/1
1000 TABLET ORAL 2 TIMES DAILY
Qty: 180 TABLET | Refills: 3 | Status: SHIPPED | OUTPATIENT
Start: 2024-11-06 | End: 2025-11-06

## 2024-11-06 RX ORDER — NAPROXEN SODIUM 220 MG/1
81 TABLET, FILM COATED ORAL DAILY
Qty: 90 TABLET | Refills: 3 | Status: SHIPPED | OUTPATIENT
Start: 2024-11-06 | End: 2025-11-06

## 2024-11-06 RX ORDER — LISINOPRIL 20 MG/1
20 TABLET ORAL DAILY
Qty: 90 TABLET | Refills: 3 | Status: SHIPPED | OUTPATIENT
Start: 2024-11-06 | End: 2025-11-06

## 2024-11-06 RX ORDER — ATORVASTATIN CALCIUM 40 MG/1
40 TABLET, FILM COATED ORAL NIGHTLY
Qty: 90 TABLET | Refills: 3 | Status: SHIPPED | OUTPATIENT
Start: 2024-11-06 | End: 2025-11-06

## 2024-11-06 NOTE — PROGRESS NOTES
I have reviewed the notes, assessments, and management plan performed by resident, I concur withhis documentation of Elsa De Guzman.  Date of Service: 11/6/2024

## 2025-04-24 ENCOUNTER — OFFICE VISIT (OUTPATIENT)
Dept: INTERNAL MEDICINE | Facility: CLINIC | Age: 70
End: 2025-04-24
Payer: MEDICARE

## 2025-04-24 VITALS
TEMPERATURE: 98 F | BODY MASS INDEX: 24.94 KG/M2 | HEART RATE: 82 BPM | HEIGHT: 66 IN | WEIGHT: 155.19 LBS | DIASTOLIC BLOOD PRESSURE: 79 MMHG | RESPIRATION RATE: 18 BRPM | OXYGEN SATURATION: 96 % | SYSTOLIC BLOOD PRESSURE: 162 MMHG

## 2025-04-24 DIAGNOSIS — E78.5 HYPERLIPIDEMIA, UNSPECIFIED HYPERLIPIDEMIA TYPE: ICD-10-CM

## 2025-04-24 DIAGNOSIS — H91.93 BILATERAL HEARING LOSS, UNSPECIFIED HEARING LOSS TYPE: ICD-10-CM

## 2025-04-24 DIAGNOSIS — E11.8 CONTROLLED TYPE 2 DIABETES MELLITUS WITH COMPLICATION, WITHOUT LONG-TERM CURRENT USE OF INSULIN: Primary | ICD-10-CM

## 2025-04-24 DIAGNOSIS — Z12.12 ENCOUNTER FOR COLORECTAL CANCER SCREENING: ICD-10-CM

## 2025-04-24 DIAGNOSIS — I10 PRIMARY HYPERTENSION: ICD-10-CM

## 2025-04-24 DIAGNOSIS — Z12.11 ENCOUNTER FOR COLORECTAL CANCER SCREENING: ICD-10-CM

## 2025-04-24 LAB — HBA1C MFR BLD: 8.5 %

## 2025-04-24 PROCEDURE — 99215 OFFICE O/P EST HI 40 MIN: CPT | Mod: PBBFAC

## 2025-04-24 RX ORDER — METFORMIN HYDROCHLORIDE 1000 MG/1
1000 TABLET ORAL 2 TIMES DAILY
Qty: 180 TABLET | Refills: 3 | Status: SHIPPED | OUTPATIENT
Start: 2025-04-24 | End: 2026-04-24

## 2025-04-24 RX ORDER — NAPROXEN SODIUM 220 MG/1
81 TABLET, FILM COATED ORAL DAILY
Qty: 90 TABLET | Refills: 3 | Status: SHIPPED | OUTPATIENT
Start: 2025-04-24 | End: 2026-04-24

## 2025-04-24 RX ORDER — LISINOPRIL 20 MG/1
20 TABLET ORAL DAILY
Qty: 90 TABLET | Refills: 3 | Status: SHIPPED | OUTPATIENT
Start: 2025-04-24 | End: 2025-04-24

## 2025-04-24 RX ORDER — ATORVASTATIN CALCIUM 40 MG/1
40 TABLET, FILM COATED ORAL NIGHTLY
Qty: 90 TABLET | Refills: 3 | Status: SHIPPED | OUTPATIENT
Start: 2025-04-24 | End: 2026-04-24

## 2025-04-24 RX ORDER — LISINOPRIL 20 MG/1
40 TABLET ORAL DAILY
Qty: 180 TABLET | Refills: 3 | Status: SHIPPED | OUTPATIENT
Start: 2025-04-24 | End: 2026-04-24

## 2025-04-24 NOTE — PROGRESS NOTES
"[facility] INTERNAL MEDICINE  OUTPATIENT OFFICE VISIT NOTE    SUBJECTIVE:      HPI: Elsa De Guzman is a 69 y.o. yo male w/ PMH of DM, HTN, HLD, HX of CVA, HX of tobacco use, who presents today for F/U visit. At this time, he has no acute complaints at this time and is compliant with his home meds.     The patient is presenting for 6 months follow up, he is complaining of hearing loss, he states that he has had it all his life, he requested hearing aids but then refused audiology or ENT referral.  His BP is elevated at 162/79, he took his medication this morning, but he states that he forgets to take it and that he hasn't taken his medication the last 4-5 days, his A1c is elevated at 8.5, he is also non compliant with his metformin.    ROS:  (-) Chest pain, palpitations, SOB, fever, night sweats, chills, diarrhea, constipation.    OBJECTIVE:     Vital signs:   BP (!) 162/79 (BP Location: Left arm, Patient Position: Sitting)   Pulse 82   Temp 98.1 °F (36.7 °C) (Oral)   Resp 18   Ht 5' 6" (1.676 m)   Wt 70.4 kg (155 lb 3.2 oz)   SpO2 96%   BMI 25.05 kg/m²      Physical Examination:  General: Thin w/o distress  HEENT: NC/AT; PERRL; hard of hearing bilaterally  Pulm: CTA bilaterally, normal work of breathing  CV: S1, S2 w/o murmurs or gallops; no edema noted  GI: Bowel sound present in all quadrants  MSK: Full ROM of all extremities  Neuro: AAOx4; motor/sensory function intact  Psych: Cooperative; appropriate mood and affect    ASSESSMENT & PLAN:     HTN  -BP elevated at 162/79  -Increased Lisinopril to 40mg daily on 11/6/2024  -Instructed patient to maintain a BP log and to take his medication regularly  -RN visit in 2 weeks for BP recheck, will further titrate antihypertensive if needed    DM II  -POC A1c increased to 8.5  -Continue Metformin 1g BID, will not add a new  medication as the patient is not compliant with his current regimen    HLD  -Lipid panel 9/8/2022: cholesterol 150, LDL 66, trig 136  -Continue " atorvastatin 40mg daily    HX of CVA (2009)  -Patient is independent with ADLs/IADLs  -Continue ASA 81mg daily and atorvastatin 40mg QHS    HX of tobacco use  -Smoked 1 PPD x 25 years, quit more than 15 years ago    Hearing loss  -the patient has complained from it for a long time  -requested hearing aids but refused audiology study or ENT referral    Health Maintenance:  -Cologuard re-ordered, instructed patient to complete the test  -Hepatitis C non-reactive 9/8/2022  -DM foot exam performed 5/13/2024  -DM eye exam ordered 5/13/2024  -AAA screening (3/8/2023) negative  -Vaccines:    -PPV23 (2/12/2018); PCV 20 (2/24/2023)    -COVID vaccines not received     -Shingrix #1 (11/17/2020) #2 (4/6/2022)    -Tdap (7/1/2020)    -Refuses flu shot    Return to clinic in 6 months. F/U on FIT; Further titration on antihypertensive meds if BP remains elevated.     Arsalan Garrett MD   [facility] Internal Medicine, PGY-II